# Patient Record
Sex: FEMALE
[De-identification: names, ages, dates, MRNs, and addresses within clinical notes are randomized per-mention and may not be internally consistent; named-entity substitution may affect disease eponyms.]

---

## 2021-06-28 PROBLEM — Z00.00 ENCOUNTER FOR PREVENTIVE HEALTH EXAMINATION: Status: ACTIVE | Noted: 2021-06-28

## 2021-06-29 ENCOUNTER — APPOINTMENT (OUTPATIENT)
Dept: CARDIOLOGY | Facility: CLINIC | Age: 46
End: 2021-06-29
Payer: COMMERCIAL

## 2021-06-29 ENCOUNTER — NON-APPOINTMENT (OUTPATIENT)
Age: 46
End: 2021-06-29

## 2021-06-29 VITALS
OXYGEN SATURATION: 98 % | SYSTOLIC BLOOD PRESSURE: 156 MMHG | DIASTOLIC BLOOD PRESSURE: 88 MMHG | TEMPERATURE: 97.6 F | WEIGHT: 134 LBS

## 2021-06-29 VITALS — SYSTOLIC BLOOD PRESSURE: 130 MMHG | DIASTOLIC BLOOD PRESSURE: 84 MMHG | HEIGHT: 67 IN | BODY MASS INDEX: 20.99 KG/M2

## 2021-06-29 VITALS — HEART RATE: 60 BPM

## 2021-06-29 DIAGNOSIS — R07.89 OTHER CHEST PAIN: ICD-10-CM

## 2021-06-29 DIAGNOSIS — F41.9 ANXIETY DISORDER, UNSPECIFIED: ICD-10-CM

## 2021-06-29 DIAGNOSIS — F32.9 ANXIETY DISORDER, UNSPECIFIED: ICD-10-CM

## 2021-06-29 DIAGNOSIS — R03.0 ELEVATED BLOOD-PRESSURE READING, W/OUT DIAGNOSIS OF HYPERTENSION: ICD-10-CM

## 2021-06-29 PROCEDURE — 93306 TTE W/DOPPLER COMPLETE: CPT

## 2021-06-29 PROCEDURE — 93000 ELECTROCARDIOGRAM COMPLETE: CPT

## 2021-06-29 PROCEDURE — 99072 ADDL SUPL MATRL&STAF TM PHE: CPT

## 2021-06-29 PROCEDURE — 99243 OFF/OP CNSLTJ NEW/EST LOW 30: CPT

## 2021-06-29 PROCEDURE — 99203 OFFICE O/P NEW LOW 30 MIN: CPT

## 2021-06-29 RX ORDER — BUSPIRONE HYDROCHLORIDE 15 MG/1
15 TABLET ORAL TWICE DAILY
Qty: 270 | Refills: 3 | Status: ACTIVE | COMMUNITY

## 2021-06-29 NOTE — ASSESSMENT
[FreeTextEntry1] : 44-year-old female with atypical chest discomfort.  May be due to mental stress/anxiety/depression.  Some of the symptomatology sounds like acid reflux.  Will obtain echocardiogram to evaluate for valvular heart disease and left ventricular function, patient scheduled for exercise stress test to rule out ischemic heart disease.  If the above testing is negative, consider a trial of H2 blockers and GI referral.  Blood pressures were mildly elevated on this visit as well.  I do not have access to her records previously but if this blood pressure level appears persistent, would certainly consider starting her antihypertensive medications as well.

## 2021-06-29 NOTE — HISTORY OF PRESENT ILLNESS
[FreeTextEntry1] : 45-year-old female referred by primary care physician for evaluation of recurrent episodes of midsternal chest discomfort.  Patient attributes these episodes to mental stressors, but is unwilling to discuss them.  The episodes last for several minutes, up to several hours and are unaccompanied by dyspnea, diaphoresis, palpitations or syncope; she does note some acid reflux symptoms as well as nausea.  The discomfort is unrelated to effort.  She was started on BuSpar several months ago and this seems to have helped somewhat.  She states that she was seen at [facility] emergency room several weeks ago for the chest pressure, was ruled out for MI and discharged.  Patient denies any prior history of coronary disease, no history of hypertension, (although she has been told of elevated blood pressures with several visits over the past several months), diabetes or hyperlipidemia.  Last bloods done by PCP from 1 month ago were not available for review.  Nondrinker, non-smoker.  Does not exercise actively.  Admits to insomnia and feels that she is stressed out from trying to work 3 jobs as well.

## 2021-07-20 ENCOUNTER — APPOINTMENT (OUTPATIENT)
Dept: CARDIOLOGY | Facility: CLINIC | Age: 46
End: 2021-07-20
Payer: COMMERCIAL

## 2021-07-20 PROCEDURE — 93015 CV STRESS TEST SUPVJ I&R: CPT

## 2021-07-20 PROCEDURE — 99072 ADDL SUPL MATRL&STAF TM PHE: CPT

## 2022-08-21 ENCOUNTER — TRANSCRIPTION ENCOUNTER (OUTPATIENT)
Age: 47
End: 2022-08-21